# Patient Record
Sex: MALE | Race: WHITE | Employment: OTHER | URBAN - METROPOLITAN AREA
[De-identification: names, ages, dates, MRNs, and addresses within clinical notes are randomized per-mention and may not be internally consistent; named-entity substitution may affect disease eponyms.]

---

## 2017-03-06 PROBLEM — I10 ESSENTIAL HYPERTENSION: Status: ACTIVE | Noted: 2017-03-06

## 2017-03-06 PROBLEM — I25.718 CORONARY ARTERY DISEASE OF AUTOLOGOUS VEIN BYPASS GRAFT WITH STABLE ANGINA PECTORIS (HCC): Status: ACTIVE | Noted: 2017-03-06

## 2017-03-06 PROBLEM — E78.1 PURE HYPERGLYCERIDEMIA: Status: ACTIVE | Noted: 2017-03-06

## 2017-03-09 ENCOUNTER — OFFICE VISIT (OUTPATIENT)
Dept: FAMILY MEDICINE CLINIC | Facility: CLINIC | Age: 82
End: 2017-03-09

## 2017-03-09 VITALS
HEIGHT: 73 IN | SYSTOLIC BLOOD PRESSURE: 122 MMHG | BODY MASS INDEX: 28.63 KG/M2 | DIASTOLIC BLOOD PRESSURE: 78 MMHG | OXYGEN SATURATION: 98 % | HEART RATE: 70 BPM | TEMPERATURE: 98 F | WEIGHT: 216 LBS | RESPIRATION RATE: 18 BRPM

## 2017-03-09 DIAGNOSIS — J06.9 ACUTE URI: ICD-10-CM

## 2017-03-09 DIAGNOSIS — J02.9 ACUTE PHARYNGITIS, UNSPECIFIED ETIOLOGY: Primary | ICD-10-CM

## 2017-03-09 DIAGNOSIS — J04.0 LARYNGITIS: ICD-10-CM

## 2017-03-09 LAB — CONTROL LINE PRESENT WITH A CLEAR BACKGROUND (YES/NO): YES YES/NO

## 2017-03-09 PROCEDURE — 99213 OFFICE O/P EST LOW 20 MIN: CPT | Performed by: NURSE PRACTITIONER

## 2017-03-09 PROCEDURE — 87880 STREP A ASSAY W/OPTIC: CPT | Performed by: NURSE PRACTITIONER

## 2017-03-09 RX ORDER — PREDNISONE 20 MG/1
TABLET ORAL
Qty: 3 TABLET | Refills: 0 | Status: SHIPPED | OUTPATIENT
Start: 2017-03-09 | End: 2017-09-11

## 2017-03-09 RX ORDER — FLUTICASONE PROPIONATE 50 MCG
SPRAY, SUSPENSION (ML) NASAL
Qty: 1 BOTTLE | Refills: 0 | Status: SHIPPED | OUTPATIENT
Start: 2017-03-09 | End: 2017-09-11

## 2017-03-09 NOTE — PATIENT INSTRUCTIONS
· Use cool mist humidifier  · Over the counter saline nasal spray or nasal saline rinse may help with congestion. · Continue to take Mucinex  · Take prednisone and use flonase as ordered      Adult Self-Care for Colds  Colds are caused by viruses.  They · As your appetite returns, you can resume your normal diet.  Ask your doctor whether there are any foods you should avoid.     When to seek medical care  When you first notice symptoms, ask your health care provider if antiviral medications are appropriate

## 2017-03-09 NOTE — PROGRESS NOTES
CHIEF COMPLAINT:   Patient presents with:  Sore Throat: x 4 days         HPI:   Belle Lau is a 80year old male presents to clinic with complaint of sore throat. Patient has had for 4 days.  Pt reports he had a colonoscopy on 3/6; after procedure Social History:    Smoking Status: Never Smoker                      Smokeless Status: Never Used                        Alcohol Use: No                 REVIEW OF SYSTEMS:   GENERAL HEALTH: feels well otherwise, normal appetite  SKIN: denies any unusual sk Kit Expiration Date 9/30/2018 Date         ASSESSMENT AND PLAN:   Assessment: 1.  Acute pharyngitis, unspecified etiology  (primary encounter diagnosis)  Acute uri  Laryngitis  Rapid strep screen is negative     Plan:   Comfort measures explained and discus · Relax, lie down. Go to bed if you want. Just get off your feet and rest. Also, drink plenty of fluids to avoid dehydration. · Take acetaminophen or a nonsteroidal anti-inflammatory agent (NSAID), such as ibuprofen.   Treat a troubled nose kindly  · Breat · Signs of dehydration, including extreme thirst, dark urine, infrequent urination, dry mouth  · Spotted, red, or very sore throat   Date Last Reviewed: 6/19/2014  © 7966-2390 [facility] [address] [facility], [address] [place] [address].  All rights

## 2017-04-03 PROBLEM — Z85.828 PERSONAL HISTORY OF OTHER MALIGNANT NEOPLASM OF SKIN: Status: ACTIVE | Noted: 2017-04-03

## 2017-05-04 ENCOUNTER — MYAURORA ACCOUNT LINK (OUTPATIENT)
Dept: OTHER | Age: 82
End: 2017-05-04

## 2017-09-11 PROBLEM — I25.718 CORONARY ARTERY DISEASE OF AUTOLOGOUS VEIN BYPASS GRAFT WITH STABLE ANGINA PECTORIS (HCC): Status: RESOLVED | Noted: 2017-03-06 | Resolved: 2017-09-11

## 2017-09-11 PROBLEM — I25.10 CORONARY ARTERY DISEASE INVOLVING NATIVE CORONARY ARTERY OF NATIVE HEART, ANGINA PRESENCE UNSPECIFIED: Status: ACTIVE | Noted: 2017-09-11

## 2018-03-05 PROBLEM — I20.0 UNSTABLE ANGINA (HCC): Status: ACTIVE | Noted: 2018-03-05

## 2018-03-26 PROBLEM — E78.00 PURE HYPERCHOLESTEROLEMIA: Status: ACTIVE | Noted: 2018-03-26

## 2018-03-26 PROBLEM — Z95.1 S/P CABG (CORONARY ARTERY BYPASS GRAFT): Status: ACTIVE | Noted: 2018-03-26

## 2018-05-04 ENCOUNTER — OFFICE VISIT (OUTPATIENT)
Dept: WOUND CARE | Age: 83
End: 2018-05-04
Attending: NURSE PRACTITIONER
Payer: MEDICARE

## 2018-05-04 ENCOUNTER — LAB ENCOUNTER (OUTPATIENT)
Dept: LAB | Age: 83
End: 2018-05-04
Payer: MEDICARE

## 2018-05-04 ENCOUNTER — APPOINTMENT (OUTPATIENT)
Dept: LAB | Age: 83
End: 2018-05-04
Attending: NURSE PRACTITIONER
Payer: MEDICARE

## 2018-05-04 DIAGNOSIS — L89.323 STAGE III PRESSURE ULCER OF LEFT BUTTOCK (HCC): Primary | ICD-10-CM

## 2018-05-04 DIAGNOSIS — I10 ESSENTIAL HYPERTENSION, BENIGN: ICD-10-CM

## 2018-05-04 DIAGNOSIS — L89.323 STAGE III PRESSURE ULCER OF LEFT BUTTOCK (HCC): ICD-10-CM

## 2018-05-04 PROCEDURE — 80053 COMPREHEN METABOLIC PANEL: CPT

## 2018-05-04 PROCEDURE — 36415 COLL VENOUS BLD VENIPUNCTURE: CPT

## 2018-05-04 PROCEDURE — 84134 ASSAY OF PREALBUMIN: CPT

## 2018-05-04 PROCEDURE — 99214 OFFICE O/P EST MOD 30 MIN: CPT

## 2018-05-04 PROCEDURE — 85025 COMPLETE CBC W/AUTO DIFF WBC: CPT

## 2018-05-04 NOTE — PROGRESS NOTES
Progress Note Details  Patient Name: Joel Jackson Date: 5/4/2018   Patient Number: [id number] Physician / Julia Medina: lEinor Nava   Patient YOB: 1930 Facility: [facility]    Chief Complaint  This information was obtained 5/4/18: Patient relates this started during a stay after cardiac surgery a few months ago. Patient was given ointment to treat area with. Pt usually goes to [address] [place] [address] for care. He also seeks care at [facility]; will have labs drawn here.  EHOB informat Cardiovascular (Central/Peripheral): Palpitations, Dyspnea on Exertion  Gastrointestinal (GI): Change in Bowel Habits, Nausea / Vomiting / Diarrhea (N/V/D), Loss of Appetite, Difficulty Swallowing, Stomach/abdominal pain  Genitourinary (): Urinary Incont Objective    Constitutional  BP Elevated, on antihypertensive meds. . Pulse RRR. RR within normal limits. Afebrile. Elevated BMI. Alert, calm, well developed, in no apparent distress.  Height/Length: 72 in (182.88 cm), Weight: 202 lbs (91.82 kgs), BMI: 27.4, (Encounter Diagnosis) I10 - Essential (primary) hypertension        Plan    Wound Orders:  Wound #1 Left Ischial     Topicals:  Initial Anesthetic Order: Apply lidocaine to wound bed on all future wound center visits during preparation for physician exam i

## 2018-05-11 ENCOUNTER — APPOINTMENT (OUTPATIENT)
Dept: WOUND CARE | Age: 83
End: 2018-05-11
Attending: NURSE PRACTITIONER
Payer: MEDICARE

## 2018-05-11 DIAGNOSIS — L89.323 STAGE III PRESSURE ULCER OF LEFT BUTTOCK (HCC): Primary | ICD-10-CM

## 2018-05-11 DIAGNOSIS — I10 ESSENTIAL HYPERTENSION, BENIGN: ICD-10-CM

## 2018-05-11 PROCEDURE — 99213 OFFICE O/P EST LOW 20 MIN: CPT

## 2018-05-11 NOTE — PROGRESS NOTES
Progress Note Details  Patient Name: Antonia Devi Date: 5/11/2018   Patient Number: [id number] Physician / Joslyn Burciaga: Mahendra Mckeon   Patient YOB: 1930 Facility: [facility]    Chief Complaint  This information was obtained Cardiovascular (Central/Peripheral):  Lower extremity (leg) swelling (Reports bilateral LE edema and uses compression stocking)  Musculoskeletal: Assistive [employer] [place])    Patient denies complaints or symptoms related to:   Constitutional Symptoms (Angeli  Wound #1 Left Ischial is a chronic Stage 3 Pressure Injury Pressure Ulcer and has received an outcome of Resolved. Subsequent wound encounter measurements are 0cm length x 0cm width with no measurable depth, with an area of 0 sq cm .  No tunneling has been

## 2018-07-11 ENCOUNTER — OFFICE VISIT (OUTPATIENT)
Dept: FAMILY MEDICINE CLINIC | Facility: CLINIC | Age: 83
End: 2018-07-11

## 2018-07-11 VITALS
TEMPERATURE: 98 F | OXYGEN SATURATION: 98 % | RESPIRATION RATE: 20 BRPM | WEIGHT: 197 LBS | HEIGHT: 73 IN | HEART RATE: 63 BPM | BODY MASS INDEX: 26.11 KG/M2 | DIASTOLIC BLOOD PRESSURE: 66 MMHG | SYSTOLIC BLOOD PRESSURE: 118 MMHG

## 2018-07-11 DIAGNOSIS — B02.9 HERPES ZOSTER WITHOUT COMPLICATION: Primary | ICD-10-CM

## 2018-07-11 PROCEDURE — 99213 OFFICE O/P EST LOW 20 MIN: CPT | Performed by: NURSE PRACTITIONER

## 2018-07-11 RX ORDER — VALACYCLOVIR HYDROCHLORIDE 1 G/1
1 TABLET, FILM COATED ORAL EVERY 8 HOURS
Qty: 21 TABLET | Refills: 0 | Status: SHIPPED | OUTPATIENT
Start: 2018-07-11 | End: 2018-07-13

## 2018-07-11 RX ORDER — PREDNISONE 20 MG/1
20 TABLET ORAL 2 TIMES DAILY
Qty: 10 TABLET | Refills: 0 | Status: SHIPPED | OUTPATIENT
Start: 2018-07-11 | End: 2018-07-16

## 2018-07-11 NOTE — PATIENT INSTRUCTIONS
Follow up with PCP for any worsening symptoms or issues    Shingles  Shingles is a viral infection caused by the same virus as chicken pox. Anyone who has had chicken pox may get shingles later in life.  The virus stays in the body, but remains dormant (a · Trim fingernails and try not to scratch. Scratching the sores may leave scars. · Stay home from work or school until all blisters have formed a crust and you are no longer contagious.   Follow-up care  Follow up with your healthcare provider or as direct

## 2018-07-11 NOTE — PROGRESS NOTES
CHIEF COMPLAINT:   Patient presents with:  Derm Problem: at Right wrist s/s for 3 days  Itchiness: burning, redness. OTC cream used         HPI:   Laure Severin is a 80year old male who presents for evaluation of a rash.   Per patient rash started in t simvastatin 40 MG Oral Tab Take 1 tablet (40 mg total) by mouth once daily. Disp: 90 tablet Rfl: 1   gabapentin 300 MG Oral Cap Take 300 mg by mouth 3 (three) times daily. Disp:  Rfl:    Aspirin (ASPIR-81) 81 MG Oral Tab EC Take 81 mg by mouth daily.  Disp: LYMPH: Denies enlargement of the lymph nodes. MUSC/SKEL: Denies joint swelling or joint stiffness. GI: Denies abdominal pain, N/V/C/D. NEURO: See HPI.       EXAM:   /66   Pulse 63   Temp 97.7 °F (36.5 °C) (Oral)   Resp 20   Ht 73\"   Wt 197 lb   Sp Sig: Take 1 tablet (1,000 mg total) by mouth every 8 (eight) hours. predniSONE 20 MG Oral Tab 10 tablet 0      Sig: Take 1 tablet (20 mg total) by mouth 2 (two) times daily.              Risks, benefits, and side effects of medication explained and · In certain cases, antiviral medicines may be prescribed to reduce pain, shorten the illness, and prevent neuralgia. Take these medicines as directed.   · Compresses made from a solution of cool water mixed with cornstarch or baking soda may help relieve p

## 2018-07-13 ENCOUNTER — TELEPHONE (OUTPATIENT)
Dept: FAMILY MEDICINE CLINIC | Facility: CLINIC | Age: 83
End: 2018-07-13

## 2018-07-13 NOTE — TELEPHONE ENCOUNTER
Patient called to advise us he had been \"misdiagnosed\". Was seen two days ago in our clinic with history of vesicular blisters to forearm, no other spots, no history of plant contact for three weeks. He had a history of chicken pox.  Patient was examined

## 2019-06-24 ENCOUNTER — OFFICE VISIT (OUTPATIENT)
Dept: FAMILY MEDICINE CLINIC | Facility: CLINIC | Age: 84
End: 2019-06-24
Payer: COMMERCIAL

## 2019-06-24 VITALS
RESPIRATION RATE: 20 BRPM | HEIGHT: 73 IN | DIASTOLIC BLOOD PRESSURE: 72 MMHG | WEIGHT: 205 LBS | BODY MASS INDEX: 27.17 KG/M2 | OXYGEN SATURATION: 97 % | HEART RATE: 77 BPM | SYSTOLIC BLOOD PRESSURE: 120 MMHG | TEMPERATURE: 98 F

## 2019-06-24 DIAGNOSIS — L03.116 CELLULITIS OF LEFT LOWER EXTREMITY: Primary | ICD-10-CM

## 2019-06-24 PROCEDURE — 99213 OFFICE O/P EST LOW 20 MIN: CPT | Performed by: NURSE PRACTITIONER

## 2019-06-24 RX ORDER — CLINDAMYCIN HYDROCHLORIDE 300 MG/1
300 CAPSULE ORAL 4 TIMES DAILY
Qty: 28 CAPSULE | Refills: 0 | Status: SHIPPED | OUTPATIENT
Start: 2019-06-24 | End: 2019-07-01

## 2019-06-24 NOTE — PROGRESS NOTES
CHIEF COMPLAINT:   Patient presents with: Insect Bite: back of left lower leg, s/s since AM.  redness/swelling/tightness at area.   OTC creams used         HPI:    Zuri Hernandez is a 80year old male who presents for evaluation of a rash on left lower Past Surgical History:   Procedure Laterality Date   • ANGIOGRAM  03/09/2018    [facility]    • ANGIOPLASTY (CORONARY)  2004    stent   • ANGIOPLASTY (CORONARY)  03/09/2018    Balloon angioplasty and stenting of ostial/proximal major diagonal branch #1   • SKIN: There is a small red papule noted to left posterior thigh, small puncture to the center of this papule is visible with light with appearance of an insect bite. There is erythema surrounding the bite extending about 5 inches in diameter.  This area is You have been diagnosed with cellulitis. This is an infection in the deepest layer of the skin. In some cases, the infection also affects the muscle. Cellulitis is caused by bacteria. The bacteria can enter the body through broken skin.  This can happen wit © 3969-1974 [employer] [address] [facility], [facility] [place]. All rights reserved. This information is not intended as a substitute for professional medical care. Always follow your healthcare professional's instructions.

## 2019-06-24 NOTE — PATIENT INSTRUCTIONS
Discharge Instructions for Cellulitis  You have been diagnosed with cellulitis. This is an infection in the deepest layer of the skin. In some cases, the infection also affects the muscle. Cellulitis is caused by bacteria.  The bacteria can enter the body Date Last Reviewed: 8/1/2016  © 6037-7565 [employer] [address]. [address] [facility], [address] [place]. All rights reserved. This information is not intended as a substitute for professional medical care.  Always follow your healthcare professional'

## 2019-07-26 ENCOUNTER — OFFICE VISIT (OUTPATIENT)
Dept: FAMILY MEDICINE CLINIC | Facility: CLINIC | Age: 84
End: 2019-07-26
Payer: COMMERCIAL

## 2019-07-26 VITALS
OXYGEN SATURATION: 98 % | WEIGHT: 191.5 LBS | SYSTOLIC BLOOD PRESSURE: 112 MMHG | HEIGHT: 73 IN | TEMPERATURE: 98 F | RESPIRATION RATE: 20 BRPM | BODY MASS INDEX: 25.38 KG/M2 | DIASTOLIC BLOOD PRESSURE: 56 MMHG | HEART RATE: 64 BPM

## 2019-07-26 DIAGNOSIS — B35.3 TINEA PEDIS OF BOTH FEET: Primary | ICD-10-CM

## 2019-07-26 PROCEDURE — 99213 OFFICE O/P EST LOW 20 MIN: CPT | Performed by: NURSE PRACTITIONER

## 2019-07-26 NOTE — PATIENT INSTRUCTIONS
Use lotrimin ultra to affected area of both feet and lower legs. Follow up with your doctor in 5-7 days for recheck. Stop hydrocortisone cream.           Athlete’s Foot    Athlete’s foot (tinea pedis) is caused by a fungal infection in the skin.  It af medical care  Get medical attention right away if any of the following occur:  · Fever of 100.4°F (38°C) or higher, or as directed  · Increasing redness or swelling of the foot  · Infection comes back soon after treatment  · Pus draining from cracks in the

## 2019-07-26 NOTE — PROGRESS NOTES
CHIEF COMPLAINT:   Patient presents with:  Rash: under left foot s/s for 1 1/2 weeks. OTC creams used  Dryness         HPI:    Justin Solares is a 80year old male who presents for evaluation of a rash. Per patient rash started in the past 1.5 weeks. ear cancer    • High blood pressure    • High cholesterol    • Leg edema    • Neuropathy       Past Surgical History:   Procedure Laterality Date   • ANGIOGRAM  03/09/2018    [facility]    • ANGIOPLASTY (CORONARY)  2004    stent   • ANGIOPLASTY (CORONARY) SKIN:   bilat heels with thickened dry peeling skin.  Dry peeling skin extending from heels up posterior lower leg, left > right;  Affected area of dry skin on lower legs is mild to moderately erythematous with satellite lesions on left leg - left leg appro Athlete’s foot (tinea pedis) is caused by a fungal infection in the skin. It affects the skin between the toes, causing cracks in the skin called fissures. It can also affect the bottom of the foot where it causes dry white scales and peeling of the skin. · Infection comes back soon after treatment  · Pus draining from cracks in the skin  Date Last Reviewed: 8/1/2016  © 4153-2672 [employer] [address]. [address] [facility], [address] [place] [address]. All rights reserved.  This information is not intended as a

## 2020-07-30 ENCOUNTER — OFFICE VISIT (OUTPATIENT)
Dept: WOUND CARE | Facility: HOSPITAL | Age: 85
End: 2020-07-30
Attending: NURSE PRACTITIONER
Payer: MEDICARE

## 2020-07-30 DIAGNOSIS — L89.323 PRESSURE ULCER OF LEFT BUTTOCK, STAGE 3 (HCC): Primary | ICD-10-CM

## 2020-07-30 PROCEDURE — 99214 OFFICE O/P EST MOD 30 MIN: CPT

## 2020-07-30 NOTE — PROGRESS NOTES
Subjective    Chief Complaint  This information was obtained from the patient  Patient is here for initial visit for pressure wound to left buttocks    General Notes:  Patient was using Triamcinolone and Nystatin which was not improving area and switched t History  This information was obtained from the patient, chart  Patient has a surgical history of:  Appendectomy  Colonoscopy  Cataract surgery  Cholecystectomy  Rotator cuff surgery  Stent placement  CABG  Open heart quadruple bypass surgery  Angioplasty (DR/EC)  Klor-Con M20 - oral 20 mEq tablet,ER particles/crystals  Lotrimin Ultra - topical 1 % cream  nystatin - topical 100,000 unit/gram ointment  fluocinonide - topical 0.05 % gel  triamcinolone acetonide - topical 0.1 % ointment  B-Complex - oral table intact. Alert and oriented times 3. No evidence of depression, anxiety, or agitation. Calm, cooperative, and communicative. Appropriate interactions and affect.         Assessment    Active Problems    ICD-10  (Encounter Diagnosis) R99.195 - Pressure ulcer

## 2020-08-13 ENCOUNTER — OFFICE VISIT (OUTPATIENT)
Dept: WOUND CARE | Facility: HOSPITAL | Age: 85
End: 2020-08-13
Attending: NURSE PRACTITIONER
Payer: MEDICARE

## 2020-08-13 DIAGNOSIS — L89.323 PRESSURE ULCER OF LEFT BUTTOCK, STAGE 3 (HCC): Primary | ICD-10-CM

## 2020-08-13 PROCEDURE — 99213 OFFICE O/P EST LOW 20 MIN: CPT

## 2020-08-13 NOTE — PROGRESS NOTES
Subjective    Chief Complaint  This information was obtained from the patient  Patient is here for follow up wound care visit for buttocks wound, states he got his [facility] cushion and has been using it as directed, denies any pain at this time.     Allergies Symptoms (General Health):  Fatigue, Fever, Loss of Appetite, Marked Weight Change, Chills  Eyes: Vision Changes  Respiratory: Cough, Shortness of Breath, Wheezing  Cardiovascular (Central/Peripheral): Chest Pain, Palpitations, Dyspnea on Exertion  Luis Manuel Gaytan independent with assistance of cane . Integumentary (Hair, Skin)  see wound documentation. Psychiatric:  Judgment and insight intact. Alert and oriented times 3. No evidence of depression, anxiety, or agitation. Calm, cooperative, and communicative.

## 2022-02-16 ENCOUNTER — OFFICE VISIT (OUTPATIENT)
Dept: WOUND CARE | Facility: HOSPITAL | Age: 87
End: 2022-02-16
Attending: NURSE PRACTITIONER
Payer: MEDICARE

## 2022-02-16 VITALS
TEMPERATURE: 99 F | BODY MASS INDEX: 19.37 KG/M2 | SYSTOLIC BLOOD PRESSURE: 113 MMHG | DIASTOLIC BLOOD PRESSURE: 62 MMHG | RESPIRATION RATE: 16 BRPM | WEIGHT: 143 LBS | HEIGHT: 72 IN | HEART RATE: 79 BPM

## 2022-02-16 DIAGNOSIS — L89.313 PRESSURE INJURY OF RIGHT BUTTOCK, STAGE 3 (HCC): Primary | ICD-10-CM

## 2022-02-16 PROCEDURE — 99215 OFFICE O/P EST HI 40 MIN: CPT | Performed by: NURSE PRACTITIONER

## 2022-02-16 RX ORDER — GABAPENTIN 300 MG/1
300 CAPSULE ORAL 3 TIMES DAILY
COMMUNITY

## 2022-02-16 NOTE — PROGRESS NOTES
.Weekly Wound Education Note    Teaching Provided To: Patient  Training Topics: Cleasing and general instructions; Discharge instructions;Dressing;Off-loading  Training Method: Written;Explain/Verbal  Training Response: Patient responds and understands           Coloplast TRIAD hydrophilic paste sample supplied to patient for use daily. Patient instructed to only use his EHOB cushion.

## 2022-03-03 ENCOUNTER — OFFICE VISIT (OUTPATIENT)
Dept: WOUND CARE | Facility: HOSPITAL | Age: 87
End: 2022-03-03
Attending: NURSE PRACTITIONER
Payer: MEDICARE

## 2022-03-03 VITALS
HEART RATE: 66 BPM | SYSTOLIC BLOOD PRESSURE: 136 MMHG | RESPIRATION RATE: 16 BRPM | TEMPERATURE: 99 F | DIASTOLIC BLOOD PRESSURE: 61 MMHG

## 2022-03-03 DIAGNOSIS — L89.313 PRESSURE INJURY OF RIGHT BUTTOCK, STAGE 3 (HCC): Primary | ICD-10-CM

## 2022-03-03 PROCEDURE — 99213 OFFICE O/P EST LOW 20 MIN: CPT | Performed by: NURSE PRACTITIONER

## 2022-03-03 NOTE — PATIENT INSTRUCTIONS
Patient discharge and wound care instructions  Richard Beatty   3/3/2022      dISCHARGE FROM CLINIC, FOLLOW UP IF NEEDED      You may shower and cleanse area with mild soap and water, dry thouroughly. Apply the MEDLINE HYDRAGUARD CREAM 1-2X DAILY    Offloading:  Use your EHOB waffle cushion at all times when sitting, even when transporting in the car if possible.

## 2022-03-03 NOTE — PROGRESS NOTES
Weekly Wound Education Note    Teaching Provided To: Patient  Training Topics: Cleasing and general instructions; Discharge instructions; Off-loading  Training Method: Explain/Verbal  Training Response: Patient responds and understands        Notes: Healed, recommend to use hydrogaurd cream.  Continue to use EHOB cushion.

## 2022-03-16 ENCOUNTER — APPOINTMENT (OUTPATIENT)
Dept: WOUND CARE | Facility: HOSPITAL | Age: 87
End: 2022-03-16
Attending: NURSE PRACTITIONER
Payer: MEDICARE

## 2022-03-17 ENCOUNTER — APPOINTMENT (OUTPATIENT)
Dept: WOUND CARE | Facility: HOSPITAL | Age: 87
End: 2022-03-17
Attending: NURSE PRACTITIONER
Payer: MEDICARE

## 2022-08-30 ENCOUNTER — TELEPHONE (OUTPATIENT)
Dept: CASE MANAGEMENT | Facility: HOSPITAL | Age: 87
End: 2022-08-30
